# Patient Record
(demographics unavailable — no encounter records)

---

## 2025-01-23 NOTE — HISTORY OF PRESENT ILLNESS
[FreeTextEntry1] : f/uy and acute visit. [de-identified] : 58-year-old female presents with complaints of chronic fatigue, intermittent bruising on the upper and lower extremities without prior trauma. The patient also reports worsening tension headaches. General labs including CMP, CBC, A1c, and lipid panel were within normal limits. She has been experiencing a mild sore throat for the last few weeks, without associated fever. No significant past medical or family history noted at this time.  symptoms very unspecific. will do FLU and COVID test. ALIZA

## 2025-01-23 NOTE — ASSESSMENT
[FreeTextEntry1] : Chronic Fatigue:  Encouraged lifestyle changes like regular sleep patterns, balanced diet, and stress management techniques. If no improvement, thyroid WNL.   Monitor bruising and review any current medications for possible side effects (e.g., anticoagulants, aspirin roled out,  INR orderd  Tension Headaches: Recommend over-the-counter analgesics as needed (e.g., ibuprofen or acetaminophen). Consider recommending stress reduction techniques such as yoga, meditation, or physical therapy for muscle tension. If headaches worsen or become more frequent, consider trial of a prescription medication like amitriptyline for prevention. Mild Sore Throat:  Continue to monitor for any new symptoms (e.g., fever, swollen lymph nodes). If sore throat persists beyond 4 weeks or worsens, consider referral to ENT or testing for strep throat or other viral infections. Recommend saline gargles or throat lozenges for comfort in the meantime.  Discussed healthy lifestyle, discussed and updated vaccinations, healthy diet, exercise, chk labs, discussed appropriate screening tests including a colonoscopy, mammo, bone density, and pap.   Discussed the importance and benefit of a healthy lifestyle, including a heart-healthy diet such as the Mediterranean diet, regular exercise, and 7 hours of sleep nightly.  Total time 30 min ( 20 min. FTF and 10 min chart review and documentation.)

## 2025-05-13 NOTE — END OF VISIT
[FreeTextEntry3] :  This note was written by Taylor Gordon on (May 13, 2025) acting as a medical scribe for Dr. Patrick This note was authored by the medical scribe for me. I have reviewed, edited, and revised the note as needed. I am in agreement with the exam findings, imaging findings, and treatment plan.  Moody Patrick MD

## 2025-05-13 NOTE — HISTORY OF PRESENT ILLNESS
[Ibandronate (Boniva)] : Ibandronate [Denosumab (Prolia)] : Denosumab [Premat. Menopause (surgery)] : a history of premature surgical menopause [Regular Dental Follow-Up] : regular dental follow-up [Disordered Eating] : no history of disordered eating [Taking Steroids] : no history of taking steroids [Hyperparathyroidism] : no history of hyperparathyroidism [Diabetes] : no history of diabetes [Kidney Stones] : no history of kidney stones [Previous Fragility Fracture] : no previous fragility fracture [Family History of Breast Cancer] : no family history of breast cancer [Family History of Hip Fracture] : no family history of hip fracture [Family History of Osteoporosis] : no family history of osteoporosis [History of Radiation Therapy] : no history of radiation therapy [FreeTextEntry1] : Pt has been on Prolia since April 2022. Last injection 6 months ago with RN. Tolerated well. Denies jaw pain. Last DDS within the past 6 months. No ONJ. Not planning any major dental work. Denies thigh pain. No interval falls or fracture. No interval surgery, hospitalizations or new medications.  The patient had a routine BMD 7/2018 which reported decreased versus 2017.  Spine -2.6 total hip -1.1 and femoral neck -2.2. The patient has no history of fractures. Risk factors for osteoporosis include an early menopause at age approximately 43 without hormone replacement therapy. Pt takes Vitamin D 1000 IU daily. Began Boniva 9/2020. Took correctly, tolerated well. No interval fx, no UGI sx, no thigh pain. No aches & pains. No heartburn.   Pt began Prolia in April 2022, tolerating well.   She had been treated with a moderate course of Lupron for infertility, but this should not not have a major residual effect.

## 2025-05-13 NOTE — PROCEDURE
[FreeTextEntry1] : Bone Mineral Density: 05/13/2025 Indication: Compared to 2023 to assess response to medication Spine:  -3.1, osteoporosis, no significant change Total hip: -1.5 osteopenia, no significant change Femoral neck:  -2.7, osteoporosis, no significant change Proximal radius: -0.6 normal, no significant change  Bone mineral density: 04/26/2023 indication: compare to     2022 spine -3.0 osteoporosis  +7.9% total hip-1.4 osteopenia osteopenia, no significant change  femoral neck -2.7 osteopenia, no significant change  proximal radius -0.3 normal no significant change   Bone mineral density: 02/02/2022  Indication: vs. 2020 Spine: -3.6 osteoporosis, no significant change Total hip: -1.5 osteopenia, no significant change Femoral neck: -2.8 osteoporosis, no significant change Proximal radius: -0.5 normal, no significant change  Bone mineral density September 29, 2020 Compared to 2018 Spine -3.5 osteoporosis decreased Total hip -1.5 osteopenia decreased Femoral neck  -2.8 osteoporosis decreased Proximal radius -0.4 normal no prior

## 2025-05-13 NOTE — ASSESSMENT
[Denosumab Therapy] : Risks  and benefits of denosumab therapy were discussed with the patient including eczema, cellulitis, osteonecrosis of the jaw and atypical femur fractures [FreeTextEntry1] : 59 y/o female presents with progressive postmenopausal osteoporosis  Patient has no history of unusual risk factors other than early menopause. The patient has no history of fractures. Patient advised for an increased risk of future fx.  Began Boniva 9/2020. Took correctly, tolerated well. No interval fx, no UGI sx, no thigh pain. No aches & pains. No heartburn. No ONJ. BMD 2/2022 no improvement osteoporosis in spine and femoral neck, stable osteopenia in total hip, and stable normal proximal radius. BMD 2023 increased spine.  BMD 05/2025 shows stable osteoporosis in spine and fem neck, stable osteopenia in total hip, and normal prox radius. BMD discussed with pt.  Pt began Prolia in April 2022, tolerating well. Continue Prolia from [facility].   Follow up in 6 months

## 2025-05-13 NOTE — PHYSICAL EXAM
[Alert] : alert [Well Nourished] : well nourished [No Acute Distress] : no acute distress [Well Developed] : well developed [Normal Sclera/Conjunctiva] : normal sclera/conjunctiva [No Proptosis] : no proptosis [Normal Oropharynx] : the oropharynx was normal [Thyroid Not Enlarged] : the thyroid was not enlarged [No Thyroid Nodules] : no palpable thyroid nodules [Clear to Auscultation] : lungs were clear to auscultation bilaterally [Normal Rate] : heart rate was normal [Regular Rhythm] : with a regular rhythm [No Edema] : no peripheral edema [Normal Bowel Sounds] : normal bowel sounds [Not Tender] : non-tender [Not Distended] : not distended [Soft] : abdomen soft [Normal Anterior Cervical Nodes] : no anterior cervical lymphadenopathy [No Spinal Tenderness] : no spinal tenderness [Spine Straight] : spine straight [No Stigmata of Cushings Syndrome] : no stigmata of Cushings Syndrome [Normal Gait] : normal gait [Normal Strength/Tone] : muscle strength and tone were normal [Normal Reflexes] : deep tendon reflexes were 2+ and symmetric [No Tremors] : no tremors [Oriented x3] : oriented to person, place, and time [No Murmurs] : no murmurs

## 2025-06-20 NOTE — ASSESSMENT
[FreeTextEntry1] : Acute Vomiting  Assessment: Acute onset of non-bloody, non-bilious vomiting over the past 8 hours, likely secondary to gastroenteritis or gastritis. Differential includes gastroesophageal reflux disease (GERD) given the associated burning chest sensation. Plan: Prescribe Ondansetron 8 mg orally as needed every 8 hours for nausea and vomiting. Instruct the patient to maintain hydration with clear fluids and electrolyte solutions. Recommend avoiding solid foods until vomiting resolves; then, gradually reintroduce your diet, starting with bland foods. Educate the patient on the signs of dehydration and when to seek medical attention. Recommend dietary modifications to avoid triggering foods if GERD is suspected.  Gastroesophageal Reflux Disease :  Assessment: Symptoms of burning chest sensation and palpitations, likely due to acid reflux, which have improved. Plan: Continue dietary modifications as discussed. Consider initiating a proton pump inhibitor (Pantoprazole) if symptoms persist or recur. Encourage weight management through diet and exercise if applicable.  Hyperlipidemia :  Assessment: Known history; current lipid panel not provided. Plan: Order lipid panel if due; otherwise, continue current management. Encourage continuation of diet and lifestyle modifications. Review current lipid-lowering therapy for adequacy.    Discussed the importance and benefit of a healthy lifestyle, including a heart-healthy diet such as the Mediterranean diet, regular exercise, and 7 hours of sleep nightly.  Total time 30 min ( 20 min. FTF and 10 min chart review and documentation.)

## 2025-06-20 NOTE — HISTORY OF PRESENT ILLNESS
[Vomiting] : vomiting [___ Days ago] : [unfilled] days ago [Nausea] : nausea [Abdominal Pain] : abdominal pain [Improving] : improving [FreeTextEntry8] : 58-year-old female with a past medical history of hyperlipidemia and postmenopausal osteoporosis, presenting today with a chief complaint of vomiting for the past 8 hours. Since the onset of symptoms, she has vomited approximately five times. The episodes were non-bloody and non-bilious, preceded by nausea and mild to moderate colicky abdominal pain. Her last episode of vomiting occurred 2 to 3 hours ago, and she has been feeling much better since then. She denies any association of her vomiting with specific foods or alcohol consumption. Additionally, she denies fever and chills. The patient also reports experiencing a burning sensation in the chest and palpitations initially, which have since improved. She is willing to undergo an electrocardiogram (EKG), which was performed today and reviewed as unremarkable.A